# Patient Record
Sex: MALE | Race: WHITE | NOT HISPANIC OR LATINO | Employment: OTHER | URBAN - METROPOLITAN AREA
[De-identification: names, ages, dates, MRNs, and addresses within clinical notes are randomized per-mention and may not be internally consistent; named-entity substitution may affect disease eponyms.]

---

## 2020-07-10 ENCOUNTER — OFFICE VISIT (OUTPATIENT)
Dept: URGENT CARE | Facility: PHYSICIAN GROUP | Age: 65
End: 2020-07-10
Payer: MEDICARE

## 2020-07-10 ENCOUNTER — HOSPITAL ENCOUNTER (EMERGENCY)
Facility: MEDICAL CENTER | Age: 65
End: 2020-07-10
Attending: EMERGENCY MEDICINE
Payer: MEDICARE

## 2020-07-10 VITALS
OXYGEN SATURATION: 96 % | HEART RATE: 77 BPM | HEIGHT: 68 IN | BODY MASS INDEX: 30.65 KG/M2 | RESPIRATION RATE: 16 BRPM | WEIGHT: 202.2 LBS | DIASTOLIC BLOOD PRESSURE: 92 MMHG | TEMPERATURE: 97.8 F | SYSTOLIC BLOOD PRESSURE: 140 MMHG

## 2020-07-10 VITALS
RESPIRATION RATE: 16 BRPM | BODY MASS INDEX: 30.64 KG/M2 | HEIGHT: 68 IN | SYSTOLIC BLOOD PRESSURE: 151 MMHG | OXYGEN SATURATION: 98 % | TEMPERATURE: 97.6 F | HEART RATE: 85 BPM | WEIGHT: 202.16 LBS | DIASTOLIC BLOOD PRESSURE: 80 MMHG

## 2020-07-10 DIAGNOSIS — N13.9 ACUTE URINARY OBSTRUCTION: ICD-10-CM

## 2020-07-10 DIAGNOSIS — R33.8 ACUTE URINARY RETENTION: ICD-10-CM

## 2020-07-10 LAB
APPEARANCE UR: CLEAR
BACTERIA #/AREA URNS HPF: NEGATIVE /HPF
BILIRUB UR QL STRIP.AUTO: NEGATIVE
COLOR UR: YELLOW
EPI CELLS #/AREA URNS HPF: NEGATIVE /HPF
GLUCOSE UR STRIP.AUTO-MCNC: NEGATIVE MG/DL
HYALINE CASTS #/AREA URNS LPF: ABNORMAL /LPF
KETONES UR STRIP.AUTO-MCNC: NEGATIVE MG/DL
LEUKOCYTE ESTERASE UR QL STRIP.AUTO: NEGATIVE
MICRO URNS: ABNORMAL
NITRITE UR QL STRIP.AUTO: NEGATIVE
PH UR STRIP.AUTO: 5 [PH] (ref 5–8)
PROT UR QL STRIP: NEGATIVE MG/DL
RBC # URNS HPF: ABNORMAL /HPF
RBC UR QL AUTO: ABNORMAL
SP GR UR STRIP.AUTO: 1.02
UROBILINOGEN UR STRIP.AUTO-MCNC: 0.2 MG/DL
WBC #/AREA URNS HPF: ABNORMAL /HPF

## 2020-07-10 PROCEDURE — 51702 INSERT TEMP BLADDER CATH: CPT

## 2020-07-10 PROCEDURE — 99283 EMERGENCY DEPT VISIT LOW MDM: CPT

## 2020-07-10 PROCEDURE — 303105 HCHG CATHETER EXTRA

## 2020-07-10 PROCEDURE — 51798 US URINE CAPACITY MEASURE: CPT

## 2020-07-10 PROCEDURE — 81001 URINALYSIS AUTO W/SCOPE: CPT

## 2020-07-10 NOTE — DISCHARGE INSTRUCTIONS
Follow-up with primary care next week for reevaluation, medication management close blood pressure monitoring.  Follow-up with urology next week as well for reevaluation and discussion regarding episode of urinary retention.    Continue any home medications as previously indicated.    Return to the emergency department for recurrent urinary retention, abdominal pain, flank pain, hematuria, fever, vomiting or other new concerns.

## 2020-07-10 NOTE — PROGRESS NOTES
Patient is 64 year old male with acute urinary retention and inability to urinate since last night. He reports extreme discomfort and urgency but cannot urinate. He is being sent to ED for further evaluation of this issue.

## 2020-07-10 NOTE — ED TRIAGE NOTES
Chief Complaint   Patient presents with   • Sent from Urgent Care     unable to urinate since this morning     Pt ambulated to triage, unable to urinate since this morning.

## 2020-07-10 NOTE — ED NOTES
Bladder scan complete at this time  Pt provided water, denying any pain at this time, denying any fullness sensations

## 2020-07-10 NOTE — ED NOTES
Pt was given discharge instructions and follow up care instructions  Pt was given information regarding medications to take at home and when to follow up with Urology   Pt had no further questions or concerns  Pt ambulated out of the ER without difficulty

## 2020-07-10 NOTE — ED NOTES
"Pt states at this time he is feeling better states \"the pressure is not there anymore\"  Traction called at this time for bladder scan  "

## 2020-07-10 NOTE — ED NOTES
Walked back into room to find patient urinating in sample cut, urinal provided at this time  Urine sample sent at this time

## 2020-07-10 NOTE — ED NOTES
Multiple attempts at terrell insertion by ashtyn RN and Cruz MORALES, unable to place terrell  MD notified, call for urology cart at this time

## 2020-07-10 NOTE — ED PROVIDER NOTES
"ED Provider Note    CHIEF COMPLAINT  Chief Complaint   Patient presents with   • Sent from Urgent Care     unable to urinate since this morning       HPI  Pankaj Xavier is a 64 y.o. male who ambulates to the emergency department through triage for urinary retention.  Patient noticed \"sputtering\" of urine stream last night, less urine output overnight, now complete retention for the last few hours.  Abdominal discomfort, lower abdominal pressure and distention.  No flank pain.  No hematuria.  No fever chills.  No nausea or vomiting.    Single episode of urinary retention multiple years ago following appendectomy.  Patient does also have history of prostate cancer, status post radiation with normal PSAs which are monitored regularly.  Sees Dr. Hirsch, urology regularly, last seen 6 months ago.  No history for BPH.    REVIEW OF SYSTEMS  See HPI for further details. All other systems are negative.   PAST MEDICAL HISTORY   has a past medical history of Prostate cancer (HCC).    SOCIAL HISTORY  Social History     Tobacco Use   • Smoking status: Never Smoker   • Smokeless tobacco: Never Used   Substance and Sexual Activity   • Alcohol use: Not on file   • Drug use: Not on file   • Sexual activity: Not on file       SURGICAL HISTORY  patient denies any surgical history    CURRENT MEDICATIONS  Home Medications     Reviewed by Marci Ritter R.N. (Registered Nurse) on 07/10/20 at 1159  Med List Status: Complete   Medication Last Dose Status        Patient Matheus Taking any Medications                       ALLERGIES  No Known Allergies    PHYSICAL EXAM  VITAL SIGNS: BP (!) 161/84   Pulse 84   Temp 36.4 °C (97.6 °F) (Temporal)   Resp 16   Ht 1.727 m (5' 8\")   Wt 91.7 kg (202 lb 2.6 oz)   SpO2 98%   BMI 30.74 kg/m²   Pulse ox interpretation: I interpret this pulse ox as normal.  Constitutional: Alert in no apparent distress.  HENT: Normocephalic, atraumatic. Bilateral external ears normal, Nose normal. Moist mucous " membranes.    Eyes:Conjunctiva normal.   Neck: Normal range of motiontender.  Lymphatic: No lymphadenopathy noted.  No inguinal mass or lymphadenopathy.  Cardiovascular: Normal peripheral perfusion.    Thorax & Lungs: Nonlabored respirations.  Abdomen: Soft, distended.  Mild discomfort with palpation across the low abdomen.  No rebound, guarding or peritonitis.  No CVA tenderness percussion.  No palpable pulsatile mass.  Skin: Warm, Dry, No erythema, No rash.   Musculoskeletal: Good range of motion in all major joints.   Neurologic: Alert and oriented x4.  Speech clear and cohesive.  Ambulates independently.    Psychiatric: Affect normal, Judgment normal, Mood normal.       DIAGNOSTIC STUDIES / PROCEDURES    LABS  Results for orders placed or performed during the hospital encounter of 07/10/20   URINALYSIS,CULTURE IF INDICATED    Specimen: Urine   Result Value Ref Range    Color Yellow     Character Clear     Specific Gravity 1.020 <1.035    Ph 5.0 5.0 - 8.0    Glucose Negative Negative mg/dL    Ketones Negative Negative mg/dL    Protein Negative Negative mg/dL    Bilirubin Negative Negative    Urobilinogen, Urine 0.2 Negative    Nitrite Negative Negative    Leukocyte Esterase Negative Negative    Occult Blood Large (A) Negative    Micro Urine Req Microscopic    URINE MICROSCOPIC (W/UA)   Result Value Ref Range    WBC 0-2 (A) /hpf    -150 (A) /hpf    Bacteria Negative None /hpf    Epithelial Cells Negative /hpf    Hyaline Cast 0-2 /lpf       COURSE & MEDICAL DECISION MAKING  Nursing notes and vital signs were reviewed. (See chart for details)  The patient records were reviewed, history was obtained from the patient;     Patient with acute urinary retention.  Unable to urinate at all the last few hours.  Mild distress secondary to discomfort.  Will place Gilbert catheter as anticipate discharge with such until seen by urology.  Urinalysis ordered as well.    ED evaluation for urinary retention, with some self  limiting resolution.  Unable to pass Gilbert catheter multiple attempts per nursing staff, cannot exclude stricture versus BPH given history of prostate cancer and radiation.  However patient was able to void independently 500 cc of urine after this.  He had significant relief in his discomfort.  Urinalysis was sent and is within normal limits, no evidence for infection.  Bladder scan with persistent 300 cc urine, however patient has since drink water and been able to urinate an additional 200 cc.  He is asymptomatic without discomfort or urgency at this time.  Will discharge with close urology follow-up.  No indication to start Flomax at this time given patient's history of radiation, cannot exclude scar tissue or stricture as cause of this retention.  He is encouraged to return for recurrent symptoms.    Patient is stable for discharge at this time, anticipatory guidance provided, close follow-up is encouraged with urology, and strict ED return instructions have been detailed. Patient is agreeable to the disposition and plan.    Patient's blood pressure was elevated in the emergency department, and has been referred to primary care for close monitoring.      FINAL IMPRESSION  (R33.8) Acute urinary retention      Electronically signed by: Sagrario Naidu D.O., 7/10/2020 12:20 PM      This dictation was created using voice recognition software. The accuracy of the dictation is limited to the abilities of the software. I expect there may be some errors of grammar and possibly content. The nursing notes were reviewed and certain aspects of this information were incorporated into this note.

## 2021-03-03 DIAGNOSIS — Z23 NEED FOR VACCINATION: ICD-10-CM

## 2021-03-11 ENCOUNTER — IMMUNIZATION (OUTPATIENT)
Dept: FAMILY PLANNING/WOMEN'S HEALTH CLINIC | Facility: IMMUNIZATION CENTER | Age: 66
End: 2021-03-11
Attending: INTERNAL MEDICINE
Payer: MEDICARE

## 2021-03-11 DIAGNOSIS — Z23 NEED FOR VACCINATION: ICD-10-CM

## 2021-03-11 DIAGNOSIS — Z23 ENCOUNTER FOR VACCINATION: Primary | ICD-10-CM

## 2021-03-11 PROCEDURE — 91300 PFIZER SARS-COV-2 VACCINE: CPT

## 2021-03-11 PROCEDURE — 0001A PFIZER SARS-COV-2 VACCINE: CPT

## 2021-04-02 ENCOUNTER — IMMUNIZATION (OUTPATIENT)
Dept: FAMILY PLANNING/WOMEN'S HEALTH CLINIC | Facility: IMMUNIZATION CENTER | Age: 66
End: 2021-04-02
Attending: INTERNAL MEDICINE
Payer: MEDICARE

## 2021-04-02 DIAGNOSIS — Z23 ENCOUNTER FOR VACCINATION: Primary | ICD-10-CM

## 2021-04-02 PROCEDURE — 0002A PFIZER SARS-COV-2 VACCINE: CPT

## 2021-04-02 PROCEDURE — 91300 PFIZER SARS-COV-2 VACCINE: CPT
